# Patient Record
Sex: MALE | Race: BLACK OR AFRICAN AMERICAN | Employment: UNEMPLOYED | URBAN - METROPOLITAN AREA
[De-identification: names, ages, dates, MRNs, and addresses within clinical notes are randomized per-mention and may not be internally consistent; named-entity substitution may affect disease eponyms.]

---

## 2020-11-12 ENCOUNTER — OFFICE VISIT (OUTPATIENT)
Dept: FAMILY MEDICINE | Facility: CLINIC | Age: 40
End: 2020-11-12
Payer: COMMERCIAL

## 2020-11-12 VITALS
OXYGEN SATURATION: 99 % | SYSTOLIC BLOOD PRESSURE: 136 MMHG | DIASTOLIC BLOOD PRESSURE: 96 MMHG | RESPIRATION RATE: 16 BRPM | HEART RATE: 106 BPM | TEMPERATURE: 96 F

## 2020-11-12 DIAGNOSIS — Z23 NEED FOR VACCINATION: ICD-10-CM

## 2020-11-12 DIAGNOSIS — Z71.84 TRAVEL ADVICE ENCOUNTER: Primary | ICD-10-CM

## 2020-11-12 PROCEDURE — 90471 IMMUNIZATION ADMIN: CPT | Mod: GA | Performed by: NURSE PRACTITIONER

## 2020-11-12 PROCEDURE — 90707 MMR VACCINE SC: CPT | Mod: GA | Performed by: NURSE PRACTITIONER

## 2020-11-12 PROCEDURE — 90715 TDAP VACCINE 7 YRS/> IM: CPT | Mod: GA | Performed by: NURSE PRACTITIONER

## 2020-11-12 PROCEDURE — 90686 IIV4 VACC NO PRSV 0.5 ML IM: CPT | Mod: GA | Performed by: NURSE PRACTITIONER

## 2020-11-12 PROCEDURE — 90691 TYPHOID VACCINE IM: CPT | Mod: GA | Performed by: NURSE PRACTITIONER

## 2020-11-12 PROCEDURE — 90472 IMMUNIZATION ADMIN EACH ADD: CPT | Mod: GA | Performed by: NURSE PRACTITIONER

## 2020-11-12 PROCEDURE — 99402 PREV MED CNSL INDIV APPRX 30: CPT | Mod: 25 | Performed by: NURSE PRACTITIONER

## 2020-11-12 RX ORDER — CIPROFLOXACIN 500 MG/1
500 TABLET, FILM COATED ORAL 2 TIMES DAILY
Qty: 6 TABLET | Refills: 0 | Status: SHIPPED | OUTPATIENT
Start: 2020-11-12

## 2020-11-12 RX ORDER — MEFLOQUINE HYDROCHLORIDE 250 MG/1
250 TABLET ORAL
Qty: 19 TABLET | Refills: 0 | Status: SHIPPED | OUTPATIENT
Start: 2020-11-12

## 2020-11-12 SDOH — HEALTH STABILITY: MENTAL HEALTH: HOW OFTEN DO YOU HAVE A DRINK CONTAINING ALCOHOL?: NEVER

## 2020-11-12 NOTE — PROGRESS NOTES
Nurse Note      Itinerary:  [facility]       Departure Date: 11/24/2020      Return Date: 02/28/2020      Length of Trip 3 months       Reason for Travel: Visiting friends and relatives           Urban or rural: both      Accommodations: Family home        IMMUNIZATION HISTORY  Have you received any immunizations within the past 4 weeks?  No  Have you ever fainted from having your blood drawn or from an injection?  No  Have you ever had a fever reaction to vaccination?  No  Have you ever had any bad reaction or side effect from any vaccination?  No  Have you ever had hepatitis A or B vaccine?  Yes  Do you live (or work closely) with anyone who has AIDS, an AIDS-like condition, any other immune disorder or who is on chemotherapy for cancer?  No  Do you have a family history of immunodeficiency?  No  Have you received any injection of immune globulin or any blood products during the past 12 months?  No    Patient roomed by AUBREY Simpson Glenroy is a 40 year old male seen today alone for counsultation for international travel.   Patient will be departing in  2 week(s) and  traveling alone.      Patient itinerary :  will be in the urban region of [place] and in the countryside for 1 week which risk for Malaria, Yellow Fever, food borne illnesses, Typhoid and Covid. exposure.      Patient's activities will include visiting friends and relatives.    Patient's country of birth is [place] [facility]     Special medical concerns: none  Pre-travel questionnaire was completed by patient and reviewed by provider.     Vitals: BP (!) 136/96   Pulse 106   Temp 96  F (35.6  C) (Tympanic)   Resp 16   SpO2 99%   BMI= There is no height or weight on file to calculate BMI.    EXAM:  General:  Well-nourished, well-developed in no acute distress.  Appears to be stated age, interacts appropriately and expresses understanding of information given to patient.    Current Outpatient Medications   Medication Sig Dispense  Refill     mefloquine (LARIAM) 250 MG tablet Take 1 tablet (250 mg) by mouth every 7 days Start 1 tab on 11/15/2020 and continue 1 tab weekly during exposure to Malaria till four weeks after 19 tablet 0     There is no problem list on file for this patient.    No Known Allergies      Immunizations discussed include:   Hepatitis A:  Up to date  Hepatitis B: Up to date  Influenza: Ordered/given today, risks, benefits and side effects reviewed  Typhoid: Ordered/given today, risks, benefits and side effects reviewed  Rabies:   reviewed managment of a animal bite or scratch (washing wound, seek medical care within 24 hours for post exposure prophylaxis ) and Insufficient time to vaccinate  Yellow Fever: Up to date  Japanese Encephalitis: Not indicated  Meningococcus: Not indicated  Tetanus/Diphtheria: Ordered/given today, risks, benefits and side effects reviewed  Measles/Mumps/Rubella: Ordered/given today  Cholera: Not needed  Polio: Up to date  Pneumococcal: Under age of 65  Varicella: Up to date per report  Zostavax:  Not indicated  Shingrix: Not indicated  HPV:  Not indicated  TB:  deferred    Altitude Exposure on this trip: no  Past tolerance to Altitude: na    ASSESSMENT/PLAN:  Problem List Items Addressed This Visit     None      Visit Diagnoses     Travel advice encounter    -  Primary    Relevant Medications    mefloquine (LARIAM) 250 MG tablet    ciprofloxacin (CIPRO) 500 MG tablet    Other Relevant Orders    TYPHOID VACCINE, IM (Completed)    HC FLU VAC PRESRV FREE QUAD SPLIT VIR > 6 MONTHS IM (Completed)    MMR, SUBQ (12+ MO) (Completed)    TDAP VACCINE (Adacel, Boostrix)  [[id number]] (Completed)    Need for vaccination        Relevant Orders    TYPHOID VACCINE, IM (Completed)    HC FLU VAC PRESRV FREE QUAD SPLIT VIR > 6 MONTHS IM (Completed)    MMR, SUBQ (12+ MO) (Completed)    TDAP VACCINE (Adacel, Boostrix)  [[id number]] (Completed)        I have reviewed general recommendations for safe travel   including:  food/water precautions, insect precautions, safer sex   practices given high prevalence of Zika, HIV and other STDs,   roadway safety. Educational materials and [employer] report provided.    Malaraia prophylaxis recommended: mefloquine per request, has tolerated it in the past   Symptomatic treatment for traveler's diarrhea: azithromycin  Altitude illness prevention and treatment: none      Evacuation insurance advised and resources were provided to patient.    Total visit time 30 minutes  with over 50% of time spent counseling patient as detailed above.    Della Asher CNP

## 2020-11-12 NOTE — NURSING NOTE
Prior to immunization administration, verified patients identity using patient s name and date of birth. Please see Immunization Activity for additional information.     Screening Questionnaire for Adult Immunization    Are you sick today?   No   Do you have allergies to medications, food, a vaccine component or latex?   No   Have you ever had a serious reaction after receiving a vaccination?   No   Do you have a long-term health problem with heart, lung, kidney, or metabolic disease (e.g., diabetes), asthma, a blood disorder, no spleen, complement component deficiency, a cochlear implant, or a spinal fluid leak?  Are you on long-term aspirin therapy?   No   Do you have cancer, leukemia, HIV/AIDS, or any other immune system problem?   No   Do you have a parent, brother, or sister with an immune system problem?   No   In the past 3 months, have you taken medications that affect  your immune system, such as prednisone, other steroids, or anticancer drugs; drugs for the treatment of rheumatoid arthritis, Crohn s disease, or psoriasis; or have you had radiation treatments?   No   Have you had a seizure, or a brain or other nervous system problem?   No   During the past year, have you received a transfusion of blood or blood    products, or been given immune (gamma) globulin or antiviral drug?   No   For women: Are you pregnant or is there a chance you could become       pregnant during the next month?   No   Have you received any vaccinations in the past 4 weeks?   No     Immunization questionnaire answers were all negative.        Per orders of JASE Asher, injection of MMR, Flu, Typhoid and TDAP given by Tatiana Chahal CMA. Patient instructed to remain in clinic for 15 minutes afterwards, and to report any adverse reaction to me immediately.       Screening performed by Tataina Chahal CMA on 11/12/2020 at 9:49 AM.

## 2020-11-12 NOTE — PATIENT INSTRUCTIONS
Today November 12, 2020 you received the    Flu Vaccine    Tetanus (Tdap) Vaccine    MMR (Measles Mumps Rubella) Vaccine    Typhoid - injectable. This vaccine is valid for two years.   .    These appointments can be made as a NURSE ONLY visit.    **It is very important for the vaccinations to be given on the scheduled day(s), this helps ensure you receive the full effectiveness of the vaccine.**    Please call [facility] with any questions [phone]    Thank you for visiting [facility]